# Patient Record
Sex: FEMALE | Race: WHITE | NOT HISPANIC OR LATINO | Employment: PART TIME | URBAN - METROPOLITAN AREA
[De-identification: names, ages, dates, MRNs, and addresses within clinical notes are randomized per-mention and may not be internally consistent; named-entity substitution may affect disease eponyms.]

---

## 2017-01-05 ENCOUNTER — OFFICE VISIT (OUTPATIENT)
Dept: INTERNAL MEDICINE | Facility: CLINIC | Age: 77
End: 2017-01-05

## 2017-01-05 VITALS — WEIGHT: 179 LBS | TEMPERATURE: 98.4 F | HEIGHT: 61 IN | BODY MASS INDEX: 33.79 KG/M2

## 2017-01-05 DIAGNOSIS — J06.9 ACUTE URI: Primary | ICD-10-CM

## 2017-01-05 LAB
EXPIRATION DATE: NORMAL
INTERNAL CONTROL: NORMAL
Lab: NORMAL
S PYO AG THROAT QL: NEGATIVE

## 2017-01-05 PROCEDURE — 87880 STREP A ASSAY W/OPTIC: CPT | Performed by: FAMILY MEDICINE

## 2017-01-05 PROCEDURE — 99213 OFFICE O/P EST LOW 20 MIN: CPT | Performed by: FAMILY MEDICINE

## 2017-01-05 RX ORDER — GUAIFENESIN AND CODEINE PHOSPHATE 100; 10 MG/5ML; MG/5ML
5 SOLUTION ORAL 3 TIMES DAILY PRN
Qty: 118 ML | Refills: 0 | Status: SHIPPED | OUTPATIENT
Start: 2017-01-05 | End: 2017-05-10

## 2017-01-05 RX ORDER — HYDROXYCHLOROQUINE SULFATE 200 MG/1
TABLET, FILM COATED ORAL
COMMUNITY
Start: 2016-12-13 | End: 2017-05-10

## 2017-01-05 NOTE — PROGRESS NOTES
"Subjective   Madelin Zuñiga is a 76 y.o. female.     History of Present Illness   Here today for possible strep. Last seen 12/7/16 for routine.    RESP- today pt reports she has been sick x3 days. 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 Is having head congestion with cough. Has not seen her phlegm.    The following portions of the patient's history were reviewed and updated as appropriate: current medications, past family history, past medical history, past social history, past surgical history and problem list.    Review of Systems   HENT: Positive for congestion, postnasal drip, rhinorrhea, sneezing and sore throat.    Respiratory: Positive for cough.    Cardiovascular: Negative for chest pain.   Gastrointestinal: Negative for abdominal distention and abdominal pain.   Skin: Negative for color change.   Neurological: Negative for tremors, speech difficulty and headaches.   Psychiatric/Behavioral: Negative for agitation and confusion.   All other systems reviewed and are negative.        Current Outpatient Prescriptions:   •  guaifenesin-codeine (GUAIFENESIN AC) 100-10 MG/5ML liquid, Take 5 mL by mouth 3 (Three) Times a Day As Needed for cough., Disp: 118 mL, Rfl: 0  •  hydroxychloroquine (PLAQUENIL) 200 MG tablet, , Disp: , Rfl:   •  lisinopril (PRINIVIL,ZESTRIL) 20 MG tablet, Take 1 tablet by mouth Daily., Disp: 90 tablet, Rfl: 1  •  metoprolol succinate XL (TOPROL-XL) 50 MG 24 hr tablet, TAKE 1 TABLET EVERY DAY, Disp: 90 tablet, Rfl: 1  •  ondansetron (ZOFRAN) 8 MG tablet, , Disp: , Rfl:     Objective     Visit Vitals   • Temp 98.4 °F (36.9 °C)   • Ht 61\" (154.9 cm)   • Wt 179 lb (81.2 kg)   • BMI 33.82 kg/m2       Physical Exam   Constitutional: She is oriented to person, place, and time. She appears well-developed and well-nourished.   HENT:   Right Ear: Tympanic membrane and ear canal normal.   Left Ear: Tympanic membrane and ear canal normal.   Mouth/Throat: Oropharynx is clear and " moist.   Eyes: Conjunctivae and EOM are normal. Pupils are equal, round, and reactive to light.   Neck: No thyromegaly present.   Cardiovascular: Normal rate and regular rhythm.    Pulmonary/Chest: Effort normal and breath sounds normal.   Neurological: She is alert and oriented to person, place, and time.   Skin: Skin is warm and dry.   Psychiatric: She has a normal mood and affect.   Vitals reviewed.      Assessment/Plan   Madelin was seen today for sore throat.    Diagnoses and all orders for this visit:    Acute URI  -     guaifenesin-codeine (GUAIFENESIN AC) 100-10 MG/5ML liquid; Take 5 mL by mouth 3 (Three) Times a Day As Needed for cough.      1. RESP- strep neg. Discussed that this is a viral URI. Will treat with codeine cough syrup. Pt has keflex at home that she can add if her phlegm turns discolored. To take 1 tsp bid x7 days if needed.  2. RECHECK- prn

## 2017-01-05 NOTE — MR AVS SNAPSHOT
Madelin Zuñiga   1/5/2017 10:15 AM   Office Visit    Dept Phone:  [phone]   Encounter #:  [id number]    Provider:  Porsche Martinez MD   Department:  [facility]                Your Full Care Plan              Today's Medication Changes          These changes are accurate as of: 1/5/17 11:04 AM.  If you have any questions, ask your nurse or doctor.               New Medication(s)Ordered:     guaifenesin-codeine 100-10 MG/5ML liquid   Commonly known as:  GUAIFENESIN AC   Take 5 mL by mouth 3 (Three) Times a Day As Needed for cough.   Started by:  Porsche Martinez MD            Where to Get Your Medications      You can get these medications from any pharmacy     Bring a paper prescription for each of these medications     guaifenesin-codeine 100-10 MG/5ML liquid                  Your Updated Medication List          This list is accurate as of: 1/5/17 11:04 AM.  Always use your most recent med list.                guaifenesin-codeine 100-10 MG/5ML liquid   Commonly known as:  GUAIFENESIN AC   Take 5 mL by mouth 3 (Three) Times a Day As Needed for cough.       hydroxychloroquine 200 MG tablet   Commonly known as:  PLAQUENIL       lisinopril 20 MG tablet   Commonly known as:  PRINIVIL,ZESTRIL   Take 1 tablet by mouth Daily.       metoprolol succinate XL 50 MG 24 hr tablet   Commonly known as:  TOPROL-XL   TAKE 1 TABLET EVERY DAY       ondansetron 8 MG tablet   Commonly known as:  ZOFRAN               You Were Diagnosed With        Codes Comments    Acute URI    -  Primary ICD-10-CM: J06.9  ICD-9-CM: 465.9       Instructions    1. RESP- strep neg. Discussed that this is a viral URI. Will treat with codeine cough syrup. Pt has keflex at home that she can add if her phlegm turns discolored. To take 1 tsp bid x7 days if needed.  2. RECHECK- prn     Patient Instructions History      Upcoming Appointments     Visit Type Date Time Department    OFFICE VISIT  "2017 10:15 AM [facility]    FOLLOW UP 2017  9:45 AM [facility]      MyChart Signup     [facility] [employer] allows you to send messages to your doctor, view your test results, renew your prescriptions, schedule appointments, and more. To sign up, go to [employer] and click on the Sign Up Now link in the New User? box. Enter your [employer] Activation Code exactly as it appears below along with the last four digits of your Social Security Number and your Date of Birth () to complete the sign-up process. If you do not sign up before the expiration date, you must request a new code.    [employer] Activation Code: [email or web address]  Expires: 2017 11:04 AM    If you have questions, you can email [email or web address] or call [phone] to talk to our [employer] staff. Remember, [employer] is NOT to be used for urgent needs. For medical emergencies, dial 911.               Other Info from Your Visit           Your Appointments     2017  9:45 AM EDT   Follow Up with Porsche Martinez MD   [facility] (--)    [facility] [address] [address] [phone]           Arrive 15 minutes prior to appointment.              Allergies     Sulfa Antibiotics        Reason for Visit     Sore Throat           Vital Signs     Temperature Height Weight Body Mass Index          98.4 °F (36.9 °C) 61\" (154.9 cm) 179 lb (81.2 kg) 33.82 kg/m2        Problems and Diagnoses Noted     Acute upper respiratory infection    -  Primary        "

## 2017-01-05 NOTE — PATIENT INSTRUCTIONS
1. RESP- strep neg. Discussed that this is a viral URI. Will treat with codeine cough syrup. Pt has keflex at home that she can add if her phlegm turns discolored. To take 1 tsp bid x7 days if needed.  2. RECHECK- prn

## 2017-01-06 ENCOUNTER — TELEPHONE (OUTPATIENT)
Dept: INTERNAL MEDICINE | Facility: CLINIC | Age: 77
End: 2017-01-06

## 2017-01-06 NOTE — TELEPHONE ENCOUNTER
----- Message from Ximena Benz sent at 1/6/2017  8:39 AM EST -----  STARTED KEFLEX,  IF SHE NEEDS TO DO ANYTHING ELSE, LET HER KNOW.

## 2017-02-08 RX ORDER — METOPROLOL SUCCINATE 50 MG/1
TABLET, EXTENDED RELEASE ORAL
Qty: 90 TABLET | Refills: 1 | Status: SHIPPED | OUTPATIENT
Start: 2017-02-08 | End: 2017-04-13 | Stop reason: SDUPTHER

## 2017-02-20 ENCOUNTER — TELEPHONE (OUTPATIENT)
Dept: INTERNAL MEDICINE | Facility: CLINIC | Age: 77
End: 2017-02-20

## 2017-02-20 NOTE — TELEPHONE ENCOUNTER
----- Message from Jignesh Delacruz sent at 2/17/2017  9:39 AM EST -----  Contact: [phone]  BLOOD PRESSURE HAS BEEN UP FOR ABOUT TWO WEEKS NOW AND CANT GET IT DOWN 186/8O... SOMETHING

## 2017-02-22 ENCOUNTER — TELEPHONE (OUTPATIENT)
Dept: INTERNAL MEDICINE | Facility: CLINIC | Age: 77
End: 2017-02-22

## 2017-02-22 NOTE — TELEPHONE ENCOUNTER
"----- Message from Porsche Martinez MD sent at 2/20/2017  5:30 PM EST -----  Contact: [phone]  Please tell her to take the metoprolol bid for the next 2wk. She can cut back down again when she is no longer sick or in pain or when her BP improves again.   ----- Message -----     From: Danelle Campbell MA     Sent: 2/20/2017   5:17 PM       To: Porsche Martinez MD    I spoke to pt this morning who says that her BP has gotten better over the weekend. She states that she has been sick for awhile with bronchitis and she assumes that is what's raising her BP. She also says that she \"smashed\" her toe with her cane. Pt is still taking both the lisinopril in the morning and metoprolol at night. She states that she was previously taking one or the other bid and wanted to know if she should go back to that. BP has now been around 160/74. Please advise and I will call pt in the morning. Thanks!     ----- Message -----     From: Jignesh Delacruz     Sent: 2/17/2017   9:39 AM       To: Danelle Campbell MA    BLOOD PRESSURE HAS BEEN UP FOR ABOUT TWO WEEKS NOW AND CANT GET IT DOWN 186/8O... SOMETHING       "

## 2017-02-22 NOTE — TELEPHONE ENCOUNTER
HEIKEM advising pt to take metoprolol bid for 2 weeks. I asked that pt call if she has any issues with high or either low BP until then.

## 2017-04-13 ENCOUNTER — TELEPHONE (OUTPATIENT)
Dept: INTERNAL MEDICINE | Facility: CLINIC | Age: 77
End: 2017-04-13

## 2017-04-13 RX ORDER — METOPROLOL SUCCINATE 50 MG/1
50 TABLET, EXTENDED RELEASE ORAL 2 TIMES DAILY
Qty: 20 TABLET | Refills: 0 | Status: SHIPPED | OUTPATIENT
Start: 2017-04-13 | End: 2017-04-23

## 2017-04-13 NOTE — TELEPHONE ENCOUNTER
----- Message from Zayda Murphy sent at 4/13/2017 10:07 AM EDT -----  Regarding: REFILL COMPLETELY OUT  HER REFILLS COME FROM HER MAIL ORDER. THEY HAVE SCREWED IT UP SO SHE WILL RECEIVE HER MEDS UNTIL 04/21. SHE TOOK HER LAST METOPROLOL THIS MORNING. CAN WE FILL ENOUGH METOPROLOL TO LAST HER UNTIL HER OTHER ARRIVES IN THE MAIL. [employer]. PATIENT [phone]. PLEASE CALL BEFORE NOON. SHE HAS TO GO TO WORK.

## 2017-04-13 NOTE — TELEPHONE ENCOUNTER
The patient states that she will be 8 days short as the mail in has not sent in her medications on time. Per pt she is still taking the toprol bid and this affected the refills. Pt informed that I will send in 10 days worth of the medication to due until her medication arrives. Rx faxed to [facility] [place].

## 2017-05-04 DIAGNOSIS — I10 ESSENTIAL HYPERTENSION: ICD-10-CM

## 2017-05-04 RX ORDER — LISINOPRIL 20 MG/1
TABLET ORAL
Qty: 90 TABLET | Refills: 1 | Status: SHIPPED | OUTPATIENT
Start: 2017-05-04 | End: 2017-10-02 | Stop reason: SDUPTHER

## 2017-05-10 ENCOUNTER — OFFICE VISIT (OUTPATIENT)
Dept: INTERNAL MEDICINE | Facility: CLINIC | Age: 77
End: 2017-05-10

## 2017-05-10 VITALS — BODY MASS INDEX: 34.74 KG/M2 | WEIGHT: 184 LBS | HEIGHT: 61 IN | TEMPERATURE: 97.4 F

## 2017-05-10 DIAGNOSIS — J30.9 ALLERGIC RHINITIS, UNSPECIFIED ALLERGIC RHINITIS TRIGGER, UNSPECIFIED RHINITIS SEASONALITY: Primary | ICD-10-CM

## 2017-05-10 DIAGNOSIS — I10 ESSENTIAL HYPERTENSION: ICD-10-CM

## 2017-05-10 DIAGNOSIS — M19.90 ARTHRITIS: ICD-10-CM

## 2017-05-10 PROCEDURE — 96372 THER/PROPH/DIAG INJ SC/IM: CPT | Performed by: FAMILY MEDICINE

## 2017-05-10 PROCEDURE — 99214 OFFICE O/P EST MOD 30 MIN: CPT | Performed by: FAMILY MEDICINE

## 2017-05-10 RX ORDER — TRIAMCINOLONE ACETONIDE 40 MG/ML
40 INJECTION, SUSPENSION INTRA-ARTICULAR; INTRAMUSCULAR ONCE
Status: COMPLETED | OUTPATIENT
Start: 2017-05-10 | End: 2017-05-10

## 2017-05-10 RX ADMIN — TRIAMCINOLONE ACETONIDE 40 MG: 40 INJECTION, SUSPENSION INTRA-ARTICULAR; INTRAMUSCULAR at 14:37

## 2017-05-23 ENCOUNTER — TELEPHONE (OUTPATIENT)
Dept: INTERNAL MEDICINE | Facility: CLINIC | Age: 77
End: 2017-05-23

## 2017-05-23 RX ORDER — METOPROLOL TARTRATE 50 MG/1
50 TABLET, FILM COATED ORAL 2 TIMES DAILY
Qty: 180 TABLET | Refills: 0 | Status: SHIPPED | OUTPATIENT
Start: 2017-05-23 | End: 2017-07-27 | Stop reason: SDUPTHER

## 2017-05-25 ENCOUNTER — TELEPHONE (OUTPATIENT)
Dept: INTERNAL MEDICINE | Facility: CLINIC | Age: 77
End: 2017-05-25

## 2017-06-08 ENCOUNTER — OFFICE VISIT (OUTPATIENT)
Dept: INTERNAL MEDICINE | Facility: CLINIC | Age: 77
End: 2017-06-08

## 2017-06-08 VITALS
HEIGHT: 61 IN | SYSTOLIC BLOOD PRESSURE: 132 MMHG | WEIGHT: 183 LBS | DIASTOLIC BLOOD PRESSURE: 72 MMHG | TEMPERATURE: 97.2 F | BODY MASS INDEX: 34.55 KG/M2

## 2017-06-08 DIAGNOSIS — I10 ESSENTIAL HYPERTENSION: Primary | ICD-10-CM

## 2017-06-08 DIAGNOSIS — M19.90 ARTHRITIS: ICD-10-CM

## 2017-06-08 PROCEDURE — 99214 OFFICE O/P EST MOD 30 MIN: CPT | Performed by: FAMILY MEDICINE

## 2017-06-08 NOTE — PROGRESS NOTES
"Subjective   Madelin Zuñiga is a 76 y.o. female.     History of Present Illness   Here for routine and recheck BP, allergies. Was last seen 5/10/17 as a work in for right ear pain. Was seen 1/5/17 for URI (neg strep test). Was seen 12/7/16 for routine. Labs done 5/10/16 demo normal CBC, CMP (Cr stable at 1.5), B12 and vit D (on supplement). Her lipid was up slightly with , . Had additional labs inpt. Gets her MCW in home thru [employer]. Recheck lab 11/7/16 demo improved Hg to 11.7 with normal B12 and iron.     1. CV- HTN and CRI. Currently on Lisinopril and metoprolol (no diuretic due to sulfa allergy). Has had to increase and decrease meds intermittently related to weight loss while sick. Was taken off metoprolol after her renal resection. However, she had elevated BP again while having pain and allergies and was restarted on the metoprolol 1mo ago. Today she reports she is still at bid on the metoprolol. Has had occasion 150 SBP at home, especially when in pain. DBP always normal.      2. - renal cell ca. Pt is post left renal resection 10/10/15 with clear margins. Post op she developed necrotizing fasciitis. Was inpt at  until 10/28/15. Had subsequent seroma and then was found to have a hernia as well. Had ventral hernia repair 9/6/16 and then developed a hematoma that got infected. Pt was then taken inpt again 9/29/16 thru 10/14/16 for left flank abscess with MRSA. She also had anemia during this. Hg was up from 6 yo 9 at discharge and recheck here was then 11.7 with normal iron and B12.     3. RHEUM- Pt sees Dr Obrien in rheumatology and Dr Fernandez in ortho. 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 No NSAID as she is allergic to ibu. Did well with stretches. Had orthovisc series 9/2015. Was off plaquenil at last visit 5/2017. Today pt reports she is having right inner thigh pain. Is intermittent and sharp/ knifelike. Improves after she \"walks it off\". Is especially bad at night. Has low back arthritis and " "was advised a shot here by Dr Fernandez. Is doing the routine stretches.    5. RESP- allergies. Has not been able to use antihistamines or singulair due to her Sjogrens. Has been using nasal steroid. Was having breakthrough PND and sneezing and was advised to increase the nasal steroid to bid. Then at her last visit she reported ER visit 1/2017 and was given abx. Was seen here with right ear pain (cerumen and sinus pressure). Was advised sweet oil and treated with kenalog. No irrigation as she got ill in past with this. Called reporting continued ear pain and was advised to see the ENT for possible ear evacuation. Today she reports that her ear finally improved gradually. Is still sneezing and blowing her nose but it is not bad. Has not tried the bee pollen yet.     The following portions of the patient's history were reviewed and updated as appropriate: current medications, past family history, past medical history, past social history, past surgical history and problem list.    Review of Systems   Cardiovascular: Negative for chest pain.   Gastrointestinal: Negative for abdominal distention and abdominal pain.   Skin: Negative for color change.   Neurological: Negative for tremors, speech difficulty and headaches.   Psychiatric/Behavioral: Negative for agitation and confusion.   All other systems reviewed and are negative.        Current Outpatient Prescriptions:   •  diclofenac (VOLTAREN) 1 % gel gel, Apply 4 g topically 4 (Four) Times a Day As Needed (pain or inflammation). To back and knee, Disp: 100 g, Rfl: 0  •  lisinopril (PRINIVIL,ZESTRIL) 20 MG tablet, TAKE 1 TABLET EVERY DAY, Disp: 90 tablet, Rfl: 1  •  metoprolol tartrate (LOPRESSOR) 50 MG tablet, Take 1 tablet by mouth 2 (Two) Times a Day for 90 days., Disp: 180 tablet, Rfl: 0    Objective     /72  Temp 97.2 °F (36.2 °C)  Ht 61\" (154.9 cm)  Wt 183 lb (83 kg)  BMI 34.58 kg/m2    Physical Exam   Constitutional: She is oriented to person, place, " and time. She appears well-developed and well-nourished.   HENT:   Right Ear: Tympanic membrane and ear canal normal.   Left Ear: Tympanic membrane and ear canal normal.   Mouth/Throat: Oropharynx is clear and moist.   Eyes: Conjunctivae and EOM are normal. Pupils are equal, round, and reactive to light.   Neck: No thyromegaly present.   Cardiovascular: Normal rate and regular rhythm.    Pulmonary/Chest: Effort normal and breath sounds normal.   Neurological: She is alert and oriented to person, place, and time.   Skin: Skin is warm and dry.   Psychiatric: She has a normal mood and affect.   Vitals reviewed.      Assessment/Plan   Madelin was seen today for follow-up.    Diagnoses and all orders for this visit:    Essential hypertension  -     CBC & Differential  -     Comprehensive Metabolic Panel  -     Lipid Panel  -     Vitamin B12  -     TSH    Arthritis  -     diclofenac (VOLTAREN) 1 % gel gel; Apply 4 g topically 4 (Four) Times a Day As Needed (pain or inflammation). To back and knee      1. CV- HTN- BP at goal. Discussed that the occasion high systolic number is reflecting pain. Will order labs to be done at outside provider due to insurance.  2. RESP- cerumen. Discussed that the wax is still present but is no longer sealed. To continue sweet oil daily, long term.   3. ORTHO- arthritis. Discussed that her thigh pain is likely referred from her back. She has f/u with ortho and rheum pending. She may need to try a DMARD. Advised voltaren gel for her back. Rx today  4. RECHECK- 6mo routine

## 2017-07-27 RX ORDER — METOPROLOL TARTRATE 50 MG/1
TABLET, FILM COATED ORAL
Qty: 180 TABLET | Refills: 0 | Status: SHIPPED | OUTPATIENT
Start: 2017-07-27 | End: 2017-09-27 | Stop reason: SDUPTHER

## 2017-09-07 ENCOUNTER — TELEPHONE (OUTPATIENT)
Dept: INTERNAL MEDICINE | Facility: CLINIC | Age: 77
End: 2017-09-07

## 2017-09-07 NOTE — TELEPHONE ENCOUNTER
LMOVM asking pt to call back so that I can triage this. I advised her that she might be able to stop by and leave a urine or if she needs to be worked in I can do that as well. I'll await for pt to call back to discuss.

## 2017-09-08 ENCOUNTER — TELEPHONE (OUTPATIENT)
Dept: INTERNAL MEDICINE | Facility: CLINIC | Age: 77
End: 2017-09-08

## 2017-09-08 ENCOUNTER — CLINICAL SUPPORT (OUTPATIENT)
Dept: INTERNAL MEDICINE | Facility: CLINIC | Age: 77
End: 2017-09-08

## 2017-09-08 DIAGNOSIS — N39.0 URINARY TRACT INFECTION WITHOUT HEMATURIA, SITE UNSPECIFIED: Primary | ICD-10-CM

## 2017-09-08 LAB
BILIRUB BLD-MCNC: NEGATIVE MG/DL
CLARITY, POC: CLEAR
COLOR UR: ABNORMAL
GLUCOSE UR STRIP-MCNC: NEGATIVE MG/DL
KETONES UR QL: NEGATIVE
LEUKOCYTE EST, POC: ABNORMAL
NITRITE UR-MCNC: NEGATIVE MG/ML
PH UR: 5 [PH] (ref 5–8)
PROT UR STRIP-MCNC: NEGATIVE MG/DL
RBC # UR STRIP: NEGATIVE /UL
SP GR UR: 1.03 (ref 1–1.03)
UROBILINOGEN UR QL: NORMAL

## 2017-09-08 PROCEDURE — 81003 URINALYSIS AUTO W/O SCOPE: CPT | Performed by: FAMILY MEDICINE

## 2017-09-08 RX ORDER — CIPROFLOXACIN 500 MG/1
500 TABLET, FILM COATED ORAL 2 TIMES DAILY
Qty: 14 TABLET | Refills: 0 | Status: SHIPPED | OUTPATIENT
Start: 2017-09-08 | End: 2017-09-15

## 2017-09-08 RX ORDER — CIPROFLOXACIN 500 MG/1
500 TABLET, FILM COATED ORAL 2 TIMES DAILY
COMMUNITY
End: 2017-09-08 | Stop reason: SDUPTHER

## 2017-09-08 NOTE — TELEPHONE ENCOUNTER
Patient is requesting a phone call about her urinalysis. (she would like more information about when you think the best time is to come)

## 2017-09-28 RX ORDER — METOPROLOL TARTRATE 50 MG/1
TABLET, FILM COATED ORAL
Qty: 180 TABLET | Refills: 0 | Status: SHIPPED | OUTPATIENT
Start: 2017-09-28 | End: 2017-12-08 | Stop reason: SDUPTHER

## 2017-10-02 DIAGNOSIS — I10 ESSENTIAL HYPERTENSION: ICD-10-CM

## 2017-10-02 RX ORDER — LISINOPRIL 20 MG/1
TABLET ORAL
Qty: 90 TABLET | Refills: 1 | Status: SHIPPED | OUTPATIENT
Start: 2017-10-02 | End: 2017-12-08 | Stop reason: SDUPTHER

## 2017-12-08 ENCOUNTER — OFFICE VISIT (OUTPATIENT)
Dept: INTERNAL MEDICINE | Facility: CLINIC | Age: 77
End: 2017-12-08

## 2017-12-08 VITALS
WEIGHT: 192 LBS | TEMPERATURE: 97.8 F | BODY MASS INDEX: 36.25 KG/M2 | DIASTOLIC BLOOD PRESSURE: 82 MMHG | SYSTOLIC BLOOD PRESSURE: 130 MMHG | HEIGHT: 61 IN

## 2017-12-08 DIAGNOSIS — J30.1 ACUTE NONSEASONAL ALLERGIC RHINITIS DUE TO POLLEN: ICD-10-CM

## 2017-12-08 DIAGNOSIS — M19.90 ARTHRITIS: ICD-10-CM

## 2017-12-08 DIAGNOSIS — I10 ESSENTIAL HYPERTENSION: Primary | ICD-10-CM

## 2017-12-08 DIAGNOSIS — D64.9 ANEMIA, UNSPECIFIED TYPE: ICD-10-CM

## 2017-12-08 DIAGNOSIS — N28.9 RENAL INSUFFICIENCY, MILD: ICD-10-CM

## 2017-12-08 PROCEDURE — 99214 OFFICE O/P EST MOD 30 MIN: CPT | Performed by: FAMILY MEDICINE

## 2017-12-08 RX ORDER — LISINOPRIL 20 MG/1
20 TABLET ORAL DAILY
Qty: 90 TABLET | Refills: 1 | Status: SHIPPED | OUTPATIENT
Start: 2017-12-08 | End: 2018-04-27 | Stop reason: DRUGHIGH

## 2017-12-08 RX ORDER — HYDROXYCHLOROQUINE SULFATE 200 MG/1
TABLET, FILM COATED ORAL
COMMUNITY
Start: 2017-11-16

## 2017-12-08 RX ORDER — METOPROLOL TARTRATE 50 MG/1
50 TABLET, FILM COATED ORAL 2 TIMES DAILY
Qty: 180 TABLET | Refills: 1 | Status: SHIPPED | OUTPATIENT
Start: 2017-12-08 | End: 2018-02-14 | Stop reason: SDUPTHER

## 2017-12-08 NOTE — PATIENT INSTRUCTIONS
1. CV- HTN, CRI- BP at goal. RF meds today. Discussed pt retiring and this is a good idea to reduce her stress and improve her health. Pt will stay very active.  2. ORTHO- arthritis- discussed that her +RF is not concerning as long as her sed rate does not go over the usual for her. Discussed that her current symptoms are still c/w Sjorgrens. Pt will f/u with Dr Cuba for additional discussion.  3. HEME- anemia- improved but borderline. Will check a B12 to ensure that she does not need to restart this for other issues.  4. RESP- allergies. Stable. Pt advised to use a Q tip to apply the nasal steroid when she needs to start it.  5. RECHECK- 6mo MCW

## 2017-12-08 NOTE — PROGRESS NOTES
Subjective   Madelin Zuñiga is a 77 y.o. female.     History of Present Illness   Here for 6mo routine. Last seen 6/8/17 for routine and recheck BP, allergies. Labs 6/8/17 demo normal CBC (Hg 11.5), CMP (except Cr 1.62, potassium 5.7), TSH, B12 and lipid with , tg 102, HDL 62, . Subsequent labs by specialist 11/21/17 demo Cr 1.62, potassium 5.6; +RF and elevated CRP and ESR.  Labs done 5/10/16 demo Cr stable at 1.5.     1. CV- HTN and CRI. Currently on Lisinopril and metoprolol (no diuretic due to sulfa allergy). Has had to increase and decrease meds intermittently related to weight loss while sick. Was taken off metoprolol after her renal resection. However, she had elevated BP again while having pain and allergies and was restarted on the metoprolol 5/2017 and was doing well at recheck 6/2017. Today she reports she is taking the lisinopril in am and the metoprolol bid.      2. - renal cell ca. Pt is post left renal resection 10/10/15 with clear margins. Post op she developed necrotizing fasciitis. Was inpt at  until 10/28/15. Had subsequent seroma and then was found to have a hernia as well. Had ventral hernia repair 9/6/16 and then developed a hematoma that got infected. Pt was then taken inpt again 9/29/16 thru 10/14/16 for left flank abscess with MRSA. She also had anemia during this time. Resolved with pt on iron and B12 since then. Today she reports she is off iron and B12 for many months now. Last Hg was stable at 11.5.  Today pt reports she will be getting a recheck MRI 1/2018.     3. RHEUM- Pt sees Dr Obrien in rheumatology and Dr Fernandez in ortho. 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 No NSAID as she is allergic to ibu. Did well with stretches. Had orthovisc series 9/2015. Was off plaquenil at  visit 5/2017. However, her lab 6/8/17 demo worsened Cr with elevated potassium and pt was referred  back to specialist. Lab 11/20/17 by Dr Hardi demo +RF with elevated CRP (1.5) and ESR (34). Today pt  "reports  She has been having very dry eyes and has been seeing the specialist due to corneal issues associated with the dry eyes. Was tried on cymbalta but did not tolerate it so was changed to plaquenil.      5. RESP- allergies. Has not been able to use antihistamines or singulair due to her Sjogrens. Has been using nasal steroid. Was having breakthrough PND and sneezing and was advised to increase the nasal steroid to bid. Then at her last visit she reported ER visit 1/2017 and was given abx. Was seen here with right ear pain (cerumen and sinus pressure). Was advised sweet oil and treated with kenalog. No irrigation as she got ill in past with this. Eventually improved. Today pt reports she is doing well currently. Usually has more issues in winter.     The following portions of the patient's history were reviewed and updated as appropriate: current medications, past family history, past medical history, past social history, past surgical history and problem list.    Review of Systems   Cardiovascular: Negative for chest pain.   Gastrointestinal: Negative for abdominal distention and abdominal pain.   Skin: Negative for color change.   Neurological: Negative for tremors, speech difficulty and headaches.   Psychiatric/Behavioral: Negative for agitation and confusion.   All other systems reviewed and are negative.        Current Outpatient Prescriptions:   •  diclofenac (VOLTAREN) 1 % gel gel, Apply 4 g topically 4 (Four) Times a Day As Needed (pain or inflammation). To back and knee, Disp: 100 g, Rfl: 0  •  hydroxychloroquine (PLAQUENIL) 200 MG tablet, , Disp: , Rfl:   •  lisinopril (PRINIVIL,ZESTRIL) 20 MG tablet, Take 1 tablet by mouth Daily., Disp: 90 tablet, Rfl: 1  •  metoprolol tartrate (LOPRESSOR) 50 MG tablet, Take 1 tablet by mouth 2 (Two) Times a Day., Disp: 180 tablet, Rfl: 1    Objective     /82  Temp 97.8 °F (36.6 °C)  Ht 154.9 cm (61\")  Wt 87.1 kg (192 lb)  BMI 36.28 kg/m2    Physical Exam "   Constitutional: She is oriented to person, place, and time. She appears well-developed and well-nourished.   HENT:   Right Ear: Tympanic membrane and ear canal normal.   Left Ear: Tympanic membrane and ear canal normal.   Mouth/Throat: Oropharynx is clear and moist.   Eyes: Conjunctivae and EOM are normal. Pupils are equal, round, and reactive to light.   Neck: No thyromegaly present.   Cardiovascular: Normal rate and regular rhythm.    Pulmonary/Chest: Effort normal and breath sounds normal.   Neurological: She is alert and oriented to person, place, and time.   Skin: Skin is warm and dry.   Psychiatric: She has a normal mood and affect.   Vitals reviewed.      Assessment/Plan   Madelin was seen today for follow-up.    Diagnoses and all orders for this visit:    Essential hypertension  -     metoprolol tartrate (LOPRESSOR) 50 MG tablet; Take 1 tablet by mouth 2 (Two) Times a Day.  -     lisinopril (PRINIVIL,ZESTRIL) 20 MG tablet; Take 1 tablet by mouth Daily.    Renal insufficiency, mild    Arthritis    Anemia, unspecified type  -     Vitamin B12    Acute nonseasonal allergic rhinitis due to pollen      1. CV- HTN, CRI- BP at goal. RF meds today. Discussed pt retiring and this is a good idea to reduce her stress and improve her health. Pt will stay very active.  2. ORTHO- arthritis- discussed that her +RF is not concerning as long as her sed rate does not go over the usual for her. Discussed that her current symptoms are still c/w Sjorgrens. Pt will f/u with Dr Cuba for additional discussion.  3. HEME- anemia- improved but borderline. Will check a B12 to ensure that she does not need to restart this for other issues.  4. RESP- allergies. Stable. Pt advised to use a Q tip to apply the nasal steroid when she needs to start it.  5. RECHECK- 6mo MCW

## 2017-12-28 ENCOUNTER — TELEPHONE (OUTPATIENT)
Dept: INTERNAL MEDICINE | Facility: CLINIC | Age: 77
End: 2017-12-28

## 2017-12-28 NOTE — TELEPHONE ENCOUNTER
Pt wants to no if you called her yesterday about her b12 shot? Pt said that she works 11:00 to 5:30

## 2017-12-28 NOTE — TELEPHONE ENCOUNTER
AKHIL advising pt that I did not contact her regarding B12. I did let her know that I saw a B12 result come through but I have not rec'd a message from Dr. Martinez advising me of a result. I will call pt as soon as I have any add'l information.

## 2018-02-14 DIAGNOSIS — I10 ESSENTIAL HYPERTENSION: ICD-10-CM

## 2018-02-15 RX ORDER — METOPROLOL TARTRATE 50 MG/1
TABLET, FILM COATED ORAL
Qty: 180 TABLET | Refills: 0 | Status: SHIPPED | OUTPATIENT
Start: 2018-02-15 | End: 2019-03-22 | Stop reason: SDUPTHER

## 2018-04-19 ENCOUNTER — TRANSITIONAL CARE MANAGEMENT TELEPHONE ENCOUNTER (OUTPATIENT)
Dept: INTERNAL MEDICINE | Facility: CLINIC | Age: 78
End: 2018-04-19

## 2018-04-19 ENCOUNTER — TELEPHONE (OUTPATIENT)
Dept: INTERNAL MEDICINE | Facility: CLINIC | Age: 78
End: 2018-04-19

## 2018-04-19 NOTE — TELEPHONE ENCOUNTER
Pt needs a hp fu for next Thursday or Friday. Pt said that she is having issues with her bp. Bp has been going up and down. Please call pt back with when she can get in.

## 2018-04-23 NOTE — OUTREACH NOTE
For VELMA call or interactive contact documentation, please refer to telephone message dated 4/19/18.

## 2018-04-27 ENCOUNTER — OFFICE VISIT (OUTPATIENT)
Dept: INTERNAL MEDICINE | Facility: CLINIC | Age: 78
End: 2018-04-27

## 2018-04-27 VITALS
HEIGHT: 61 IN | SYSTOLIC BLOOD PRESSURE: 118 MMHG | DIASTOLIC BLOOD PRESSURE: 70 MMHG | TEMPERATURE: 97.5 F | WEIGHT: 184.6 LBS | BODY MASS INDEX: 34.85 KG/M2

## 2018-04-27 DIAGNOSIS — I10 ESSENTIAL HYPERTENSION: Primary | ICD-10-CM

## 2018-04-27 PROCEDURE — 99495 TRANSJ CARE MGMT MOD F2F 14D: CPT | Performed by: FAMILY MEDICINE

## 2018-04-27 RX ORDER — LISINOPRIL 10 MG/1
10 TABLET ORAL DAILY
COMMUNITY
End: 2019-01-08 | Stop reason: DRUGHIGH

## 2018-04-27 RX ORDER — AMLODIPINE BESYLATE 5 MG/1
5 TABLET ORAL 2 TIMES DAILY
COMMUNITY
Start: 2018-04-17 | End: 2018-04-27

## 2018-04-27 NOTE — PATIENT INSTRUCTIONS
1. VELMA- post renal resection for renal ca. Wound healing well with no sign of infection or other complication.  2. HTN- discussed that the labile BP is situational and she has had the same in the past. She needs to wean off the norvasc by going to twice a day to once a day for a week and then stop it. Is to return to hr usual doses of metoprolol and lisinoril HCT starting today. Discussed that her goal SBP is around 130-140.   3. RECHECK- prn (keep Glo)

## 2018-04-27 NOTE — PROGRESS NOTES
Transitional Care Follow Up Visit  Subjective     Madelin Zuñiga is a 77 y.o. female who presents for a transitional care management visit.    Within 48 business hours after discharge our office contacted her via telephone to coordinate her care and needs.      I reviewed and discussed the details of that call along with the discharge summary, hospital problems, inpatient lab results, inpatient diagnostic studies, and consultation reports with Madelin.    No flowsheet data found.    History of Present Illness   Course During Hospital Stay:  04/12/2018 - 04/17/2018    Here for VELMA. Last seen 12/8/17 for 6mo routine. Last seen 6/8/17 for routine and recheck BP, allergies. Labs 6/8/17 demo normal CBC (Hg 11.5), CMP (except Cr 1.62, potassium 5.7), TSH, B12 and lipid with , tg 102, HDL 62, . Subsequent labs by specialist 11/21/17 demo Cr 1.62, potassium 5.6; +RF and elevated CRP and ESR.  Labs done 5/10/16 demo Cr stable at 1.5.     VELMA- Pt has h/o left renal ca, partial (1/3) resection 10/10/15. Had complications with post op necrotizing fasciitis, followed by seroma and hernia, followed by abscess with MRSA. Pt became anemic during the process but did well with iron and B12 supplements. Base back to baseline at her visit 12/8/17. Pt was inpt t [facility] 4/12/- 4/17/18. Pt has hx renal ca. Had new mass found on routine surveillance. Had left radical / total  nephrectomy with no complications. Today pt reports she got her pathology and has clear cell with one area around the renal vein that was not clear. Has 6wk recheck pending; may require radiation and then chemo. Had elevated BP and was given addition of norvasc and now that she is home she is going too low. Has tried cutting the lisinopril and metoprolol in half and taking the norvasc 1/2 bid. Her wound is healing well. Is only sore and only needing tylenol. Is urinating well.     1. CV- HTN and CRI. Currently on Lisinopril and metoprolol (no diuretic due to  sulfa allergy). Has had to increase and decrease meds intermittently related to weight loss while sick. Was taken off metoprolol after her renal resection. However, she had elevated BP again while having pain and allergies and was restarted on the metoprolol 5/2017. Had been stable on lisinopril in am and the metoprolol bid. Has had the addition of Norvasc related to recent renal issues.   2. RHEUM- Pt sees Dr Obrien in rheumatology and Dr Fernandez in ortho. 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 No NSAID as she is allergic to ibu. Did well with stretches. Had orthovisc series 9/2015. Was off plaquenil at  visit 5/2017. However, her lab 6/8/17 demo worsened Cr with elevated potassium and pt was referred  back to specialist. Lab 11/20/17 by Dr Cuba demcarmen +RF with elevated CRP (1.5) and ESR (34). Pt did not tolerate Cymbalta and was changed to plaquenil. Was still having dry eyes, seeing a corneal specialist.  3. RESP- allergies. Has not been able to use antihistamines or singulair due to her Sjogrens. Has been using nasal steroid. Was having breakthrough PND and sneezing and was advised to increase the nasal steroid to bid. Then at her last visit she reported ER visit 1/2017 and was given abx. Was seen here with right ear pain (cerumen and sinus pressure). Was advised sweet oil and treated with kenalog. No irrigation as she got ill in past with this. Eventually improved.     The following portions of the patient's history were reviewed and updated as appropriate: current medications, past family history, past medical history, past social history, past surgical history and problem list.    Review of Systems   Cardiovascular: Negative for chest pain.   Gastrointestinal: Negative for abdominal distention and abdominal pain.   Skin: Negative for color change.   Neurological: Negative for tremors, speech difficulty and headaches.   Psychiatric/Behavioral: Negative for agitation and confusion.   All other systems reviewed and  are negative.        Current Outpatient Prescriptions:   •  lisinopril (PRINIVIL,ZESTRIL) 10 MG tablet, Take 10 mg by mouth Daily., Disp: , Rfl:   •  diclofenac (VOLTAREN) 1 % gel gel, Apply 4 g topically 4 (Four) Times a Day As Needed (pain or inflammation). To back and knee, Disp: 100 g, Rfl: 0  •  hydroxychloroquine (PLAQUENIL) 200 MG tablet, , Disp: , Rfl:   •  metoprolol tartrate (LOPRESSOR) 50 MG tablet, TAKE 1 TABLET TWICE DAILY, Disp: 180 tablet, Rfl: 0    Objective   Physical Exam   Constitutional: She is oriented to person, place, and time. She appears well-developed and well-nourished.   HENT:   Right Ear: Tympanic membrane and ear canal normal.   Left Ear: Tympanic membrane and ear canal normal.   Mouth/Throat: Oropharynx is clear and moist.   Eyes: Conjunctivae and EOM are normal. Pupils are equal, round, and reactive to light.   Neck: No thyromegaly present.   Cardiovascular: Normal rate and regular rhythm.    Pulmonary/Chest: Effort normal and breath sounds normal.   Neurological: She is alert and oriented to person, place, and time.   Skin: Skin is warm and dry.   Psychiatric: She has a normal mood and affect.   Vitals reviewed.      Assessment/Plan   Madelin was seen today for follow-up.    Diagnoses and all orders for this visit:    Essential hypertension        1. VELMA- post renal resection for renal ca. Wound healing well with no sign of infection or other complication.  2. HTN- discussed that the labile BP is situational and she has had the same in the past. She needs to wean off the norvasc by going to twice a day to once a day for a week and then stop it. Is to return to hr usual doses of metoprolol and lisinoril HCT starting today. Discussed that her goal SBP is around 130-140.   3. RECHECK- prn (keep Glo)       Current outpatient and discharge medications have been reconciled for the patient.  Porsche Martinez MD

## 2018-04-30 ENCOUNTER — OFFICE VISIT (OUTPATIENT)
Dept: INTERNAL MEDICINE | Facility: CLINIC | Age: 78
End: 2018-04-30

## 2018-04-30 VITALS
WEIGHT: 184.6 LBS | SYSTOLIC BLOOD PRESSURE: 108 MMHG | BODY MASS INDEX: 34.85 KG/M2 | TEMPERATURE: 97.3 F | HEIGHT: 61 IN | DIASTOLIC BLOOD PRESSURE: 76 MMHG

## 2018-04-30 DIAGNOSIS — K11.20 PAROTIDITIS: ICD-10-CM

## 2018-04-30 DIAGNOSIS — Q87.19 SJOGREN-LARSSON SYNDROME: Primary | ICD-10-CM

## 2018-04-30 DIAGNOSIS — I10 ESSENTIAL HYPERTENSION: ICD-10-CM

## 2018-04-30 PROCEDURE — 96372 THER/PROPH/DIAG INJ SC/IM: CPT | Performed by: FAMILY MEDICINE

## 2018-04-30 PROCEDURE — 99214 OFFICE O/P EST MOD 30 MIN: CPT | Performed by: FAMILY MEDICINE

## 2018-04-30 RX ORDER — TRIAMCINOLONE ACETONIDE 40 MG/ML
40 INJECTION, SUSPENSION INTRA-ARTICULAR; INTRAMUSCULAR ONCE
Status: COMPLETED | OUTPATIENT
Start: 2018-04-30 | End: 2018-04-30

## 2018-04-30 RX ORDER — TRAMADOL HYDROCHLORIDE 50 MG/1
TABLET ORAL
Qty: 30 TABLET | Refills: 0 | Status: SHIPPED | OUTPATIENT
Start: 2018-04-30

## 2018-04-30 RX ADMIN — TRIAMCINOLONE ACETONIDE 40 MG: 40 INJECTION, SUSPENSION INTRA-ARTICULAR; INTRAMUSCULAR at 10:38

## 2018-05-21 ENCOUNTER — TELEPHONE (OUTPATIENT)
Dept: INTERNAL MEDICINE | Facility: CLINIC | Age: 78
End: 2018-05-21

## 2018-05-21 NOTE — TELEPHONE ENCOUNTER
Tell her that the best thing for this is lidocaine gel. It is OTC as aspircream or we can send in a Rx. Which does she prefer?

## 2018-05-21 NOTE — TELEPHONE ENCOUNTER
Pt seen in ER at [facility] on Saturday (Yana is getting records) for chest muscle pain. She states that they gave her muscle relaxers and oxycodone but she is still in a lot of pain. The only comfortable position that she can get in is lying on her back. She called ortho to schedule follow up appointment but they do not see these types of things. She says that she doesn't want to come in if there's not really anything we can offer her. She would just like some advise on what else she should do or if she does need to come in for a visit? Please advise.

## 2018-06-19 ENCOUNTER — OFFICE VISIT (OUTPATIENT)
Dept: INTERNAL MEDICINE | Facility: CLINIC | Age: 78
End: 2018-06-19

## 2018-06-19 VITALS
HEART RATE: 50 BPM | WEIGHT: 178 LBS | DIASTOLIC BLOOD PRESSURE: 60 MMHG | OXYGEN SATURATION: 98 % | HEIGHT: 61 IN | BODY MASS INDEX: 33.61 KG/M2 | SYSTOLIC BLOOD PRESSURE: 106 MMHG

## 2018-06-19 DIAGNOSIS — M19.90 ARTHRITIS: ICD-10-CM

## 2018-06-19 DIAGNOSIS — I10 ESSENTIAL HYPERTENSION: ICD-10-CM

## 2018-06-19 DIAGNOSIS — Q87.19 SJOGREN-LARSSON SYNDROME: ICD-10-CM

## 2018-06-19 DIAGNOSIS — Z00.00 ANNUAL PHYSICAL EXAM: Primary | ICD-10-CM

## 2018-06-19 DIAGNOSIS — Z78.0 POST-MENOPAUSE: ICD-10-CM

## 2018-06-19 PROCEDURE — G0439 PPPS, SUBSEQ VISIT: HCPCS | Performed by: FAMILY MEDICINE

## 2018-06-19 PROCEDURE — 96160 PT-FOCUSED HLTH RISK ASSMT: CPT | Performed by: FAMILY MEDICINE

## 2018-06-19 RX ORDER — AMLODIPINE BESYLATE 2.5 MG/1
TABLET ORAL
COMMUNITY
Start: 2018-06-14 | End: 2019-01-08

## 2018-06-19 NOTE — PATIENT INSTRUCTIONS
"1. MCW- will order bone density. Will skip mammo this year. Discussed deferring her colonoscopy.  Advised zostarix.   2. CV- HTN- discussed that she has complicated issues including some \"white coat syndrome\" as her BP is usually better here; also has had pain and recent steroid. To continue on the low dose norvasc and give it another week to see the max improvement it will provide. Will f/u with nephrology for this for now.  3. RHEUM- Sjorgrens- advised to take routine vitamin D at 1000 IU a day as there are studies that have shown that this can help improve the function of the immune system, including auto immune. Will check to see when her pneumovax was done. Pt to get zostarix at pharmacy.   4. ORTHO- arthritis with acute right clavicular strain. Discussed continuing the topical lidocaine. Discussed PT but pt is getting ready to start chemo and cannot pursue this at this time.  5. RECHECK- 6mo routine  "

## 2018-06-19 NOTE — PROGRESS NOTES
QUICK REFERENCE INFORMATION:  The ABCs of the Annual Wellness Visit    Subsequent Medicare Wellness Visit    HEALTH RISK ASSESSMENT    1940    Recent Hospitalizations:  Recently treated at the following:  Other: [facility].        Current Medical Providers:  Patient Care Team:  Porsche Martinez MD as PCP - General        Smoking Status:  History   Smoking Status   • Former Smoker   Smokeless Tobacco   • Not on file       Alcohol Consumption:  History   Alcohol use Not on file       Depression Screen:   PHQ-2/PHQ-9 Depression Screening 6/19/2018   Little interest or pleasure in doing things 0   Feeling down, depressed, or hopeless 0   Total Score 0       Health Habits and Functional and Cognitive Screening:  Functional & Cognitive Status 6/19/2018   Do you have difficulty preparing food and eating? No   Do you have difficulty bathing yourself, getting dressed or grooming yourself? No   Do you have difficulty using the toilet? No   Do you have difficulty moving around from place to place? Yes   Do you have trouble with steps or getting out of a bed or a chair? Yes   In the past year have you fallen or experienced a near fall? No   Current Diet Well Balanced Diet   Dental Exam Up to date   Eye Exam Up to date   Exercise (times per week) 3 times per week   Current Exercise Activities Include Yoga   Do you need help using the phone?  No   Are you deaf or do you have serious difficulty hearing?  No   Do you need help with transportation? No   Do you need help shopping? No   Do you need help preparing meals?  No   Do you need help with housework?  No   Do you need help with laundry? No   Do you need help taking your medications? No   Do you need help managing money? No   Do you ever drive or ride in a car without wearing a seat belt? No   Have you felt unusual stress, anger or loneliness in the last month? No   Who do you live with? Alone   If you need help, do you have trouble finding someone available to you? No    Have you been bothered in the last four weeks by sexual problems? No   Do you have difficulty concentrating, remembering or making decisions? No           Does the patient have evidence of cognitive impairment? No    Aspirin use counseling: Contraindicated from taking ASA      Recent Lab Results:  CMP:  Lab Results   Component Value Date    BUN 26 (H) 05/12/2015    CREATININE 1.4 (H) 05/12/2015     05/12/2015    K 4.8 05/12/2015    CO2 26 05/12/2015    CALCIUM 9.1 05/12/2015    ALBUMIN 4.1 10/20/2014    BILITOT 0.5 10/20/2014    ALKPHOS 75 10/20/2014    AST 20 10/20/2014    ALT 12 10/20/2014     Lipid Panel:  Lab Results   Component Value Date    TRIG 129 10/20/2014    HDL 60 10/20/2014     HbA1c:       Visual Acuity:  No exam data present    Age-appropriate Screening Schedule:  Refer to the list below for future screening recommendations based on patient's age, sex and/or medical conditions. Orders for these recommended tests are listed in the plan section. The patient has been provided with a written plan.    Health Maintenance   Topic Date Due   • TDAP/TD VACCINES (1 - Tdap) 08/21/1960   • ZOSTER VACCINE (1 of 2) 08/21/1991   • PNEUMOCOCCAL VACCINES (65+ LOW/MEDIUM RISK) (1 of 2 - PCV13) 08/21/2006   • COLONOSCOPY  11/07/2016   • MAMMOGRAM  05/17/2018   • INFLUENZA VACCINE  08/01/2018        Subjective   History of Present Illness    Madelin Zuñiga is a 77 y.o. female who presents for an Subsequent Wellness Visit.  Here for MCW. Last seen 4/30/18 for Sjorgrens flare. Was seen 4/27/18 for VELMA. Was seen 12/8/17 for 6mo routine. Labs 6/8/17 demo normal CBC (Hg 11.5), CMP (except Cr 1.62, potassium 5.7), TSH, B12 and lipid with , tg 102, HDL 62, . Subsequent labs by specialist 11/21/17 demo Cr 1.62, potassium 5.6; +RF and elevated CRP and ESR.  Labs done 5/10/16 demo Cr stable at 1.5.     1.-h/o left renal ca, partial (1/3) resection 10/10/15. Had post op necrotizing fasciitis, followed by seroma  and hernia, followed by abscess with MRSA. Pt became anemic during the process but did well with iron and B12 supplements. Base back to baseline at her visit 12/8/17. Pt then had new mass with surgery 4/2018 for left radical / total nephrectomy with no complications. Was advised she may require radiation followed by chemo.     2. RHEUM- sjorgrens. Pt sees Dr Obrien in rheumatology and Dr Frenandez in ortho. 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 No NSAID as she is allergic to ibu. Did well with stretches. Had Orthovisc series 9/2015. Was off plaquenil at  visit 5/2017. However, her lab 6/8/17 demo worsened Cr with elevated potassium and pt was referred  back to specialist. Lab 11/20/17 by Dr Rosa Elena linn +RF with elevated CRP (1.5) and ESR (34). Pt did not tolerate Cymbalta and was changed to plaquenil. Was still having dry eyes, seeing a corneal specialist. Pt was last seen here 4/30/18 due to partoditis, treated with kenalog and tramadol. Today she reports she responded well to the steroid. She then developed spontaneous drainage. She does do daily massage to keep her parotids draining. Pt pulled her right shoulder about 6wk ago. Went to the ER at [facility]; xray was neg. Is still sore. She was not able to tolerate the tramadol. Was advised lidocaine gel. States this is helping. Still has swelling.    3. CV- HTN and CRI. Currently on Lisinopril and metoprolol (no diuretic due to sulfa allergy). Has had to increase and decrease meds intermittently related to weight loss while sick. Was taken off metoprolol after her initial renal resection. However, she had elevated BP again while having pain and allergies and was restarted on the metoprolol 5/2017. She then had elevated BP during her last renal surgery and was given the addition or Norvasc inpt. When seen here her BP was starting to go low and she was given a Norvasc taper. Today she reports she was off the norvasc but then when she saw Dr Mabry (nephrology) 1wk ago she  was at 160 and was restarted on the norvasc but at a lower dose. Home checks are now 147-156 SBP, 68-71 DBP.    4. RESP- allergies. Has not been able to use antihistamines or singulair due to her Sjogrens. Has been using nasal steroid.    5. MCW- Last done:  SCREENING:   Bone Density: due   Colonoscopy: 2013- Dr Stroud   Mammogram: today pt reports she got it done in 2017 around October. On record she had 5/17/16   Pap: NA. No DUB:   Glaucoma: Dr Solis with frequent exams due to Sjrogrens   Low dose CT chest (smokers): remote smoker, quit over 40yr ago. Is getting scans due to possible mets from her kidney  IMMUNIZATIONS:   Pneumovax: x2 in past   Prevnar 13: 12/2015 at [facility]   Zostavax: yes.   Flu: yearly at [facility]   TDaP: 2013  Hep B series: NA  LIVING WILL: yes  SPECIALTY: Rheumatology- Dr Cuba   Ortho- Dr Fernandez   Urology Onc- Dr Peralta at    Nepology- Nephrology Asso   Optho- Dr Solis    The following portions of the patient's history were reviewed and updated as appropriate: allergies, past family history, past medical history, past social history, past surgical history and problem list.    Outpatient Medications Prior to Visit   Medication Sig Dispense Refill   • hydroxychloroquine (PLAQUENIL) 200 MG tablet      • lisinopril (PRINIVIL,ZESTRIL) 10 MG tablet Take 10 mg by mouth Daily.     • metoprolol tartrate (LOPRESSOR) 50 MG tablet TAKE 1 TABLET TWICE DAILY 180 tablet 0   • diclofenac (VOLTAREN) 1 % gel gel Apply 4 g topically 4 (Four) Times a Day As Needed (pain or inflammation). To back and knee 100 g 0   • traMADol (ULTRAM) 50 MG tablet Take 1-2 tab po q6hr prn pain 30 tablet 0     No facility-administered medications prior to visit.        Patient Active Problem List   Diagnosis   • Acid reflux   • Anemia   • Arthritis   • Diverticulitis   • Essential hypertension   • IBS (irritable bowel syndrome)   • Insomnia   • Renal cancer   • Renal insufficiency, mild   • Seroma, postoperative   •  "Sjogren-Philip syndrome   • Allergic rhinitis       Advance Care Planning:  has an advance directive - a copy has been provided and is in file    Identification of Risk Factors:  Risk factors include: low bp.    Review of Systems   Cardiovascular: Negative for chest pain.   Gastrointestinal: Negative for abdominal distention and abdominal pain.   Skin: Negative for color change.   Neurological: Negative for tremors, speech difficulty and headaches.   Psychiatric/Behavioral: Negative for agitation and confusion.   All other systems reviewed and are negative.      Compared to one year ago, the patient feels her physical health is worse.  Compared to one year ago, the patient feels her mental health is the same.    Objective     Physical Exam   Constitutional: She is oriented to person, place, and time. She appears well-developed and well-nourished.   HENT:   Right Ear: Tympanic membrane and ear canal normal.   Left Ear: Tympanic membrane and ear canal normal.   Mouth/Throat: Oropharynx is clear and moist.   Eyes: Conjunctivae and EOM are normal. Pupils are equal, round, and reactive to light.   Neck: No thyromegaly present.   Cardiovascular: Normal rate and regular rhythm.    Pulmonary/Chest: Effort normal and breath sounds normal.   Musculoskeletal:   Fulness right clavicle at sternum. Full ROM with twinge of pain with full elevation.   Neurological: She is alert and oriented to person, place, and time.   Skin: Skin is warm and dry.   Psychiatric: She has a normal mood and affect.   Vitals reviewed.      Vitals:    06/19/18 1011   BP: 106/60   Pulse: 50   SpO2: 98%   Weight: 80.7 kg (178 lb)   Height: 154.9 cm (60.98\")       Patient's Body mass index is 33.65 kg/m². BMI is above normal parameters. Recommendations include: no follow-up required.      Assessment/Plan   Patient Self-Management and Personalized Health Advice  The patient has been provided with information about: fall prevention and preventive services " "including:   · Advance directive.    Visit Diagnoses:  No diagnosis found.    No orders of the defined types were placed in this encounter.      Outpatient Encounter Prescriptions as of 6/19/2018   Medication Sig Dispense Refill   • Acetaminophen (TYLENOL) 325 MG capsule Take  by mouth.     • amLODIPine (NORVASC) 2.5 MG tablet      • hydroxychloroquine (PLAQUENIL) 200 MG tablet      • lisinopril (PRINIVIL,ZESTRIL) 10 MG tablet Take 10 mg by mouth Daily.     • metoprolol tartrate (LOPRESSOR) 50 MG tablet TAKE 1 TABLET TWICE DAILY 180 tablet 0   • diclofenac (VOLTAREN) 1 % gel gel Apply 4 g topically 4 (Four) Times a Day As Needed (pain or inflammation). To back and knee 100 g 0   • traMADol (ULTRAM) 50 MG tablet Take 1-2 tab po q6hr prn pain 30 tablet 0     No facility-administered encounter medications on file as of 6/19/2018.        Reviewed use of high risk medication in the elderly: not applicable  Reviewed for potential of harmful drug interactions in the elderly: not applicable    Follow Up:  No Follow-up on file.     An After Visit Summary and PPPS with all of these plans were given to the patient.    1. MCW- will order bone density. Will skip mammo this year. Discussed deferring her colonoscopy. Advised zostarix.   2. CV- HTN- discussed that she has complicated issues including some \"white coat syndrome\" as her BP is usually better here; also has had pain and recent steroid. To continue on the low dose norvasc and give it another week to see the max improvement it will provide. Will f/u with nephrology for this for now.  3. RHEUM- Sjorgrens- advised to take routine vitamin D at 1000 IU a day as there are studies that have shown that this can help improve the function of the immune system, including auto immune. Will check to see when her pneumovax was done. Pt to get zostarix at pharmacy.   4. ORTHO- arthritis with acute right clavicular strain. Discussed continuing the topical lidocaine. Discussed PT but pt " is getting ready to start chemo and cannot pursue this at this time.  5. RECHECK- 6mo routine

## 2018-09-21 ENCOUNTER — TELEPHONE (OUTPATIENT)
Dept: INTERNAL MEDICINE | Facility: CLINIC | Age: 78
End: 2018-09-21

## 2018-09-21 NOTE — TELEPHONE ENCOUNTER
PATIENT WAS IN [facility] FOR DEHYDRATION. SHE DOESN'T WANT TO COME IN BUT I THOUGHT YOU COULD ADVISE HER. CALL HER [phone] DIAD. DRY KIDNEY

## 2019-01-08 ENCOUNTER — OFFICE VISIT (OUTPATIENT)
Dept: INTERNAL MEDICINE | Facility: CLINIC | Age: 79
End: 2019-01-08

## 2019-01-08 VITALS
DIASTOLIC BLOOD PRESSURE: 88 MMHG | BODY MASS INDEX: 31.72 KG/M2 | HEIGHT: 61 IN | TEMPERATURE: 97.4 F | WEIGHT: 168 LBS | SYSTOLIC BLOOD PRESSURE: 138 MMHG

## 2019-01-08 DIAGNOSIS — I10 ESSENTIAL HYPERTENSION: Primary | ICD-10-CM

## 2019-01-08 DIAGNOSIS — J30.1 NON-SEASONAL ALLERGIC RHINITIS DUE TO POLLEN: ICD-10-CM

## 2019-01-08 PROCEDURE — 99213 OFFICE O/P EST LOW 20 MIN: CPT | Performed by: FAMILY MEDICINE

## 2019-01-08 RX ORDER — LISINOPRIL 5 MG/1
TABLET ORAL
COMMUNITY
Start: 2018-11-02 | End: 2019-01-14 | Stop reason: DRUGHIGH

## 2019-01-08 RX ORDER — CABOZANTINIB 40 MG/1
TABLET ORAL
COMMUNITY
Start: 2018-12-27

## 2019-01-08 NOTE — PATIENT INSTRUCTIONS
1. CV- HTN- BP elevations related to chemo. Discussed that her BP is ok here today. She is to come by several mornings a week for the next 1-2wk for BP check as this is a regular cuff instead of electronic. If she is not at goal (150's) she can increase the lisinopril to 20mg but is not to do that now as her home checks may be over estimating her BP.   2. RESP- allergies- stable. Having cerumen on right. Advised to try warmed olive oil.   3. RECHECK- May

## 2019-01-08 NOTE — PROGRESS NOTES
Subjective   Madelin Zuñiga is a 78 y.o. female.     History of Present Illness   Here for 6mo routine. 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 Was seen 4/30/18 for Sjorgrens flare. Was seen 4/27/18 for VELMA. Labs 6/8/17 demo normal CBC (Hg 11.5), CMP (except Cr 1.62, potassium 5.7), TSH, B12 and lipid with , tg 102, HDL 62, . Subsequent labs by specialist 11/21/17 demo Cr 1.62, potassium 5.6; +RF and elevated CRP and ESR.  Labs done 5/10/16 demo Cr stable at 1.5.     1.- left renal ca, h/o partial (1/3) resection 10/10/15. Had post op necrotizing fasciitis, followed by seroma and hernia, followed by abscess with MRSA. Pt became anemic during the process but did well with iron and B12 supplements. Back to baseline at her visit 12/8/17. Pt then had new mass with surgery 4/2018 for left radical / total nephrectomy with no complications. Was then treated with radiation followed by chemo. Recent records reviewed with note from 10/1/18 noting that chemo (optivo) was stopped due to ocular Sjogren/ arthralgia flare. Had significant shrinkage of nodules while on chemo but return once chemo stopped. Last chest CT 12/3/18 showing progressive lung nodules/ metastasis; CT abd neg. Labs reviewed. Today pt reports she has been on cabozantinib for 2wk now and is tolerating it well. It can raise her BP Her next CT is due in Feb. Has lost 10 lb to 168 lb and still feels well. Is on iron and B12 and feels better.     2. RHEUM- sjorgrens. Pt sees Dr Obrien in rheumatology and Dr Fernandez in ortho. 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 No NSAID as she is allergic to ibu. Did well with stretches. Had Orthovisc series 9/2015. Was off plaquenil at  visit 5/2017. However, her lab 6/8/17 demo worsened Cr with elevated potassium and pt was referred  back to specialist. Lab 11/20/17 by Dr Cuba demo +RF with elevated CRP (1.5) and ESR (34). Pt did not tolerate Cymbalta and was changed to plaquenil. Was still having dry eyes, seeing a corneal  specialist. Pt was last seen here 4/30/18 due to partodititis, treated with kenalog and tramadol and did well. Pulled her right shoulder 5/2018, treated with lidocaine gel. Today she reports the arthralgias and ocular flare she had while on chemo improved when the chemo was stopped. Is currently on plaquenil. Is walking with a cane with no pain other than her knee. Just got steroid shots in both knees and this helped.      3. CV- HTN and CRI. Currently on Lisinopril and metoprolol (no diuretic due to sulfa allergy). Has had to increase and decrease meds intermittently related to weight loss while sick. Was taken off metoprolol after her initial renal resection. However, she had elevated BP again while having pain and allergies and was restarted on the metoprolol 5/2017. She then had elevated BP during her last renal surgery and was given the addition or Norvasc inpt. When seen here her BP was starting to go low and she was given a Norvasc taper but then saw nephrology (Dr Mabry) and BP was up again; was continued on norvasc 5. Today she reports she is current taking metoprolol 25 bid and lisinopril 10 qd. She had /100 when first started the new chemo. Had change in meds and has improved to 160's/80's. Is highest in am. Sees nephrology in 1mo. Was advised goal BP of 150/70 by nephrology. Had been advised she may need to go to 20mg on lisinopril.      4. RESP- allergies. Has not been able to use antihistamines or singulair due to her Sjogrens. Has been using nasal steroid. Today she reports she has had ear clogging for 2wk. Has been using debrox on right.     The following portions of the patient's history were reviewed and updated as appropriate: current medications, past family history, past medical history, past social history, past surgical history and problem list.    Review of Systems   Cardiovascular: Negative for chest pain.   Gastrointestinal: Negative for abdominal distention and abdominal pain.  "  Skin: Negative for color change.   Neurological: Negative for tremors, speech difficulty and headaches.   Psychiatric/Behavioral: Negative for agitation and confusion.   All other systems reviewed and are negative.        Current Outpatient Medications:   •  Acetaminophen (TYLENOL) 325 MG capsule, Take  by mouth., Disp: , Rfl:   •  CABOMETYX 40 MG tablet, , Disp: , Rfl:   •  diclofenac (VOLTAREN) 1 % gel gel, Apply 4 g topically 4 (Four) Times a Day As Needed (pain or inflammation). To back and knee, Disp: 100 g, Rfl: 0  •  hydroxychloroquine (PLAQUENIL) 200 MG tablet, , Disp: , Rfl:   •  lisinopril (PRINIVIL,ZESTRIL) 5 MG tablet, , Disp: , Rfl:   •  metoprolol tartrate (LOPRESSOR) 50 MG tablet, TAKE 1 TABLET TWICE DAILY, Disp: 180 tablet, Rfl: 0  •  traMADol (ULTRAM) 50 MG tablet, Take 1-2 tab po q6hr prn pain, Disp: 30 tablet, Rfl: 0    Objective     /88   Temp 97.4 °F (36.3 °C)   Ht 154.9 cm (61\")   Wt 76.2 kg (168 lb)   BMI 31.74 kg/m²     Physical Exam   Constitutional: She is oriented to person, place, and time. She appears well-developed and well-nourished.   HENT:   Right Ear: Tympanic membrane and ear canal normal.   Left Ear: Tympanic membrane and ear canal normal.   Mouth/Throat: Oropharynx is clear and moist.   Eyes: Conjunctivae and EOM are normal. Pupils are equal, round, and reactive to light.   Neck: No thyromegaly present.   Cardiovascular: Normal rate and regular rhythm.   Pulmonary/Chest: Effort normal and breath sounds normal.   Neurological: She is alert and oriented to person, place, and time.   Skin: Skin is warm and dry.   Psychiatric: She has a normal mood and affect.   Vitals reviewed.      Assessment/Plan   Madelin was seen today for follow-up.    Diagnoses and all orders for this visit:    Essential hypertension    Non-seasonal allergic rhinitis due to pollen        1. CV- HTN- BP elevations related to chemo. Discussed that her BP is ok here today. She is to come by several " mornings a week for the next 1-2wk for BP check as this is a regular cuff instead of electronic. If she is not at goal (150's) she can increase the lisinopril to 20mg but is not to do that now as her home checks may be over estimating her BP.   2. RESP- allergies- stable. Having cerumen on right. Advised to try warmed olive oil.   3. RECHECK- May

## 2019-01-10 ENCOUNTER — TELEPHONE (OUTPATIENT)
Dept: INTERNAL MEDICINE | Facility: CLINIC | Age: 79
End: 2019-01-10

## 2019-01-11 ENCOUNTER — TELEPHONE (OUTPATIENT)
Dept: INTERNAL MEDICINE | Facility: CLINIC | Age: 79
End: 2019-01-11

## 2019-01-11 NOTE — TELEPHONE ENCOUNTER
Please tell her to double the lisinopril from 10 to 20mg and this should do it. She is still welcome to get BP checks if she wants but she will also be following up with nephrology for this.

## 2019-01-11 NOTE — TELEPHONE ENCOUNTER
Pt BP on 1/10/19 was 162/86, today it was 154/90 here at our office and 154/80 at her oncology visit today. Pt wanted to know if you could make a decision regarding her BP medication and dosing just based on those numbers so she wouldn't have to come back in for BP checks next week. Please advise.     Also, pt states that they did not increase her chemo meds.

## 2019-01-12 NOTE — TELEPHONE ENCOUNTER
Pt informed. She states that she still has lisinopril 20mg from a previous prescription and then nephrology would take over rx. Pt advised to call if she needs anything from us.

## 2019-01-14 ENCOUNTER — TELEPHONE (OUTPATIENT)
Dept: INTERNAL MEDICINE | Facility: CLINIC | Age: 79
End: 2019-01-14

## 2019-01-14 RX ORDER — LISINOPRIL 10 MG/1
10 TABLET ORAL DAILY
Qty: 30 TABLET | Refills: 0 | Status: SHIPPED | OUTPATIENT
Start: 2019-01-14 | End: 2019-03-21

## 2019-01-14 NOTE — TELEPHONE ENCOUNTER
Pt called stating that the nephrologist is going to have her take 10 mg in the morning and 20 mg at night. Pt needs refills of 10 mg as she cannot get in touch with nephrologists office. Will send in 10 mg now. Pt aware.

## 2019-02-04 ENCOUNTER — TELEPHONE (OUTPATIENT)
Dept: INTERNAL MEDICINE | Facility: CLINIC | Age: 79
End: 2019-02-04

## 2019-02-04 RX ORDER — LEVOFLOXACIN 750 MG/1
TABLET ORAL
Qty: 5 TABLET | Refills: 0 | Status: SHIPPED | OUTPATIENT
Start: 2019-02-04 | End: 2019-03-21

## 2019-02-04 NOTE — TELEPHONE ENCOUNTER
Pt states that she will skip the medication and see if she can just take the abx and get well from that. Pt advised if she is not feeling better or is getting worse to let me know and I can work her in to the schedule.

## 2019-02-04 NOTE — TELEPHONE ENCOUNTER
She can do a Leena Pot. No other meds due to her Sjorgrens and kidney cancer (chemo) except that she can have tessalon pearles. Has she taken these before. If she is ok with this, we can send in an Rx.

## 2019-02-04 NOTE — TELEPHONE ENCOUNTER
Pt advised of medication at pharmacy to . She says that she is having awful headaches/sinus pressure and has been taking tylenol. She's asking if there's anything else she could take safely? Please advise.

## 2019-02-04 NOTE — TELEPHONE ENCOUNTER
Please tell her I sent in levaquin. This is not related to any abx she is allergic to and is very powerful but only taken for 5 days which is the safest option given her hx.

## 2019-02-06 ENCOUNTER — TELEPHONE (OUTPATIENT)
Dept: INTERNAL MEDICINE | Facility: CLINIC | Age: 79
End: 2019-02-06

## 2019-03-14 ENCOUNTER — TRANSITIONAL CARE MANAGEMENT TELEPHONE ENCOUNTER (OUTPATIENT)
Dept: INTERNAL MEDICINE | Facility: CLINIC | Age: 79
End: 2019-03-14

## 2019-03-15 NOTE — OUTREACH NOTE
VELMA call completed.  Please refer to TCM call flowsheet for call documentation.    The patient states she is feeling better. She states she continues with BLE edema however sx's continue to improve daily. Pt is monitoring daily weights. Pt states she is breathing easier, no cough or wheezing. She is eating a renal diet. Pt states urinating well. She is ambulating with a walker. Pt states she is concerned about finding a ride to PCP appt 3/21/19 however states if BLE edema contiues to improve she feels she will be able to drive safely. Encouraged her to contact Patient Care Coordinators if transportation assistance is needed and coordinators will reach out to  for assistance. Pt denies any questions or needs at time of call. PCP appt confirmed.

## 2019-03-21 ENCOUNTER — OFFICE VISIT (OUTPATIENT)
Dept: INTERNAL MEDICINE | Facility: CLINIC | Age: 79
End: 2019-03-21

## 2019-03-21 VITALS
DIASTOLIC BLOOD PRESSURE: 68 MMHG | SYSTOLIC BLOOD PRESSURE: 150 MMHG | WEIGHT: 169 LBS | TEMPERATURE: 97.1 F | HEIGHT: 61 IN | BODY MASS INDEX: 31.91 KG/M2

## 2019-03-21 DIAGNOSIS — I10 ESSENTIAL HYPERTENSION: ICD-10-CM

## 2019-03-21 DIAGNOSIS — N28.9 RENAL INSUFFICIENCY, MILD: ICD-10-CM

## 2019-03-21 DIAGNOSIS — J18.9 COMMUNITY ACQUIRED PNEUMONIA, UNSPECIFIED LATERALITY: ICD-10-CM

## 2019-03-21 DIAGNOSIS — R60.9 PERIPHERAL EDEMA: Primary | ICD-10-CM

## 2019-03-21 PROCEDURE — 99495 TRANSJ CARE MGMT MOD F2F 14D: CPT | Performed by: FAMILY MEDICINE

## 2019-03-21 NOTE — PATIENT INSTRUCTIONS
1. CV/ RENAL- discussed that if nephrology calls her before the weekend, to follow their instructions. If they do not get a hold of her in time, to start to cut down on the isosorbide. She is currently taking 3 tabs twice a day; is to cut down to 3 in am and 2 in pm. No change in metoprolol or nifedipine. Discussed that these all can help with BP, fluid overload and afib. Pt will then keep her 1wk recheck with nephrology. Advised to wrap her legs to address the swelling that is limited to just her lower legs.   2. RESP- pneumonia clinically improved.  3. RECHECK- keep appointment in May

## 2019-03-21 NOTE — PROGRESS NOTES
Transitional Care Follow Up Visit  Subjective     Madelin Zuñiga is a 78 y.o. female who presents for a transitional care management visit.    Within 48 business hours after discharge our office contacted her via telephone to coordinate her care and needs.      I reviewed and discussed the details of that call along with the discharge summary, hospital problems, inpatient lab results, inpatient diagnostic studies, and consultation reports with Madelin.    Date of TCM Phone Call 3/15/2019   Hospital [facility]   Date of Admission 2/27/2019   Date of Discharge 3/12/2019   Discharge Disposition Home or Self Care       History of Present Illness   Course During Hospital Stay: 02/27/2019 - 03/12/2019  Here for VELMA. Last seen 1/8/19 for 6mo routine. 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 Was seen 4/30/18 for Sjorgrens flare. Was seen 4/27/18 for VELMA. Labs 6/8/17 demo normal CBC (Hg 11.5), CMP (except Cr 1.62, potassium 5.7), TSH, B12 and lipid with , tg 102, HDL 62, . Subsequent labs by specialist 11/21/17 demo Cr 1.62, potassium 5.6; +RF and elevated CRP and ESR.  Labs done 5/10/16 demo Cr stable at 1.5.    VELMA/- left renal ca, h/o partial (1/3) resection 10/10/15. Had post op necrotizing fasciitis, followed by seroma and hernia, followed by abscess with MRSA. Pt became anemic during the process but did well with iron and B12 supplements. Back to baseline at her visit 12/8/17. Pt then had new mass with surgery 4/2018 for left radical / total nephrectomy with no complications. Was then treated with radiation followed by chemo. Recent records reviewed with note from 10/1/18 noting that chemo (optivo) was stopped due to ocular Sjogren/ arthralgia flare. Had significant shrinkage of nodules while on chemo but return once chemo stopped. Last chest CT 12/3/18 showing progressive lung nodules/ metastasis; CT abd neg. Pt changed to cabozantinib  and was tolerating it well other than elevated BP. Had lost 10 lb to 168  lb but was taking iron and B12 and felt well.  Also got pneumonia, was treated with levaquin inpt. No abx at discharge.     Pt hospitalized at [facility] 2/27/19 - 3/12/19 for acute on chronic renal failure, acute respiratory failure with suspected pulmonary edema and accelerated HTN. Pt was admitted due to dyspnea with N/V. Had been given bumex by GI but had not started it yet. Echo and resp w/u neg. Pt treated with IV albumin and diuresed adequately. Was followed by nephrology inpt. Was deconditioned but insurance denied rehab. Today pt reports she had brief afib but this self corrected with fluid correction (30 lb off in 2 days) with no additional w/u indicated. Her BP meds were changed. Reports that the fluid overload was from kidney failure related to the chemo. Out patient she was given bumex to take until she was back to her goal weight and is there now. BP meds are now isosorbid, metoprolol and nifedipine. BP has gradually gotten better and now has been too low at home. Is higher today as this is her first time getting out. Is waiting for call back from nephrology to adjust her BP meds.     2. CV- HTN and CRI. Currently on Lisinopril and metoprolol (no diuretic due to sulfa allergy). Has had to increase and decrease meds intermittently related to weight loss while sick. Was taken off metoprolol after her initial renal resection. However, she had elevated BP again while having pain and allergies and was restarted on the metoprolol 5/2017. She then had elevated BP during her last renal surgery and was given the addition or Norvasc inpt. When seen here her BP was starting to go low and she was given a Norvasc taper but then saw nephrology (Dr Mabry) and BP was up again; was continued on norvasc 5. Then at her last visit she had been changed to metoprolol 25 bid and lisinopril 10 qd by nephrology. Was having elevated BP related to chemo. Had been advised by nephrology that her goal BP was 150/70. Continued to run  160’s and was increased on Lisinopril to 20mg after discussion here.   3.RHEUM- sjorgrens. Pt sees Dr Obrien in rheumatology and Dr Fernandez in ortho. 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 No NSAID as she is allergic to ibu. Did well with stretches. Had Orthovisc series 9/2015. Was off plaquenil at  visit 5/2017. However, her lab 6/8/17 demo worsened Cr with elevated potassium and pt was referred  back to specialist. Lab 11/20/17 by Dr Rosa Elena linn +RF with elevated CRP (1.5) and ESR (34). Pt did not tolerate Cymbalta and was changed to plaquenil. Was still having dry eyes, seeing a corneal specialist. Pt was seen here 4/30/18 due to partodititis, treated with kenalog and tramadol and did well. Pulled her right shoulder 5/2018, treated with lidocaine gel. At her visit 1/8/19 she reported that the arthralgias and ocular flare she had while on chemo improved when the chemo was changed. Still on plaquenil.     4. RESP- allergies. Has not been able to use antihistamines or singulair due to her Sjogrens. Has been using nasal steroid.      The following portions of the patient's history were reviewed and updated as appropriate: past family history, past medical history, past social history, past surgical history and problem list.    Review of Systems   Cardiovascular: Negative for chest pain.   Gastrointestinal: Negative for abdominal distention and abdominal pain.   Skin: Negative for color change.   Neurological: Negative for tremors, speech difficulty and headaches.   Psychiatric/Behavioral: Negative for agitation and confusion.   All other systems reviewed and are negative.        Current Outpatient Medications:   •  Acetaminophen (TYLENOL) 325 MG capsule, Take  by mouth., Disp: , Rfl:   •  CABOMETYX 40 MG tablet, , Disp: , Rfl:   •  diclofenac (VOLTAREN) 1 % gel gel, Apply 4 g topically 4 (Four) Times a Day As Needed (pain or inflammation). To back and knee, Disp: 100 g, Rfl: 0  •  hydroxychloroquine (PLAQUENIL) 200 MG  tablet, , Disp: , Rfl:   •  metoprolol tartrate (LOPRESSOR) 50 MG tablet, TAKE 1 TABLET TWICE DAILY, Disp: 180 tablet, Rfl: 0  •  traMADol (ULTRAM) 50 MG tablet, Take 1-2 tab po q6hr prn pain, Disp: 30 tablet, Rfl: 0    Objective   Physical Exam   Constitutional: She is oriented to person, place, and time. She appears well-developed and well-nourished.   HENT:   Right Ear: Tympanic membrane and ear canal normal.   Left Ear: Tympanic membrane and ear canal normal.   Mouth/Throat: Oropharynx is clear and moist.   Eyes: Conjunctivae and EOM are normal. Pupils are equal, round, and reactive to light.   Neck: No thyromegaly present.   Cardiovascular: Normal rate and regular rhythm.   Pulmonary/Chest: Effort normal and breath sounds normal.   Neurological: She is alert and oriented to person, place, and time.   Skin: Skin is warm and dry.   Psychiatric: She has a normal mood and affect.   Vitals reviewed.      Assessment/Plan   Madelin was seen today for follow-up.    Diagnoses and all orders for this visit:    Peripheral edema    Renal insufficiency, mild    Essential hypertension    Community acquired pneumonia, unspecified laterality        1. CV/ RENAL- discussed that if nephrology calls her before the weekend, to follow their instructions. If they do not get a hold of her in time, to start to cut down on the isosorbide. She is currently taking 3 tabs twice a day; is to cut down to 3 in am and 2 in pm. No change in metoprolol or nifedipine. Discussed that these all can help with BP, fluid overload and afib. Pt will then keep her 1wk recheck with nephrology. Advised to wrap her legs to address the swelling that is limited to just her lower legs.   2. RESP- pneumonia clinically improved.  3. RECHECK- keep appointment in May       Current outpatient and discharge medications have been reconciled for the patient.  Reviewed by: Porsche Martinez MD

## 2019-03-22 DIAGNOSIS — I10 ESSENTIAL HYPERTENSION: ICD-10-CM

## 2019-03-22 RX ORDER — METOPROLOL TARTRATE 50 MG/1
TABLET, FILM COATED ORAL
Qty: 180 TABLET | Refills: 1 | Status: SHIPPED | OUTPATIENT
Start: 2019-03-22 | End: 2019-03-29 | Stop reason: SDUPTHER

## 2019-03-29 ENCOUNTER — TELEPHONE (OUTPATIENT)
Dept: INTERNAL MEDICINE | Facility: CLINIC | Age: 79
End: 2019-03-29

## 2019-03-29 DIAGNOSIS — I10 ESSENTIAL HYPERTENSION: ICD-10-CM

## 2019-03-29 RX ORDER — METOPROLOL TARTRATE 50 MG/1
50 TABLET, FILM COATED ORAL 2 TIMES DAILY
Qty: 6 TABLET | Refills: 0 | Status: SHIPPED | OUTPATIENT
Start: 2019-03-29

## 2019-03-29 NOTE — TELEPHONE ENCOUNTER
PT STATES HER LOPRESSOR HAS NOT COME IN THE MAIL YET. SO SHE IS REQUESTING A RX FOR 3 DAYS TO GET HER THROUGH THE WEEKEND SENT TO [facility]

## 2019-04-05 ENCOUNTER — TELEPHONE (OUTPATIENT)
Dept: INTERNAL MEDICINE | Facility: CLINIC | Age: 79
End: 2019-04-05

## 2019-04-05 NOTE — TELEPHONE ENCOUNTER
Pt called stating that she saw nephrology and the doctor said that the protein/creatnine ratio in her urine is very high at 11,584 and her random urine protein is at 2,757. The nephrologist has advised her that she will need to take 70 mg of prednisone for 1 month. Pt states that she is very hesitant to take such a high dose for such an extended amount of time and would like your opinion on the matter. She has been in and out of the hospital for the last 5 weeks and said that she would rather not even take the prednisone at all if it's going to put her back in the hospital. Please advise.

## 2019-04-05 NOTE — TELEPHONE ENCOUNTER
Pt wants you to call her back and it is about a med. Pt said that she has been in the hospital for 5 weeks.

## 2019-04-05 NOTE — TELEPHONE ENCOUNTER
She needs to do what the specialist suggests. She is fighting kidney cancer and they are the people to let us know her best options. Tell her that we treat people who have inflammation in their arteries with the same dose so it has been used at this dose for other people as well.

## 2019-05-21 ENCOUNTER — OFFICE VISIT (OUTPATIENT)
Dept: INTERNAL MEDICINE | Facility: CLINIC | Age: 79
End: 2019-05-21

## 2019-05-21 VITALS
HEIGHT: 61 IN | WEIGHT: 175 LBS | BODY MASS INDEX: 33.04 KG/M2 | SYSTOLIC BLOOD PRESSURE: 140 MMHG | DIASTOLIC BLOOD PRESSURE: 72 MMHG

## 2019-05-21 DIAGNOSIS — J01.90 ACUTE BACTERIAL SINUSITIS: ICD-10-CM

## 2019-05-21 DIAGNOSIS — H10.022 PINK EYE DISEASE, LEFT: ICD-10-CM

## 2019-05-21 DIAGNOSIS — B96.89 ACUTE BACTERIAL SINUSITIS: ICD-10-CM

## 2019-05-21 DIAGNOSIS — K59.00 CONSTIPATION, UNSPECIFIED CONSTIPATION TYPE: ICD-10-CM

## 2019-05-21 DIAGNOSIS — I10 ESSENTIAL HYPERTENSION: Primary | ICD-10-CM

## 2019-05-21 PROCEDURE — 99214 OFFICE O/P EST MOD 30 MIN: CPT | Performed by: FAMILY MEDICINE

## 2019-05-21 RX ORDER — LEVOFLOXACIN 750 MG/1
TABLET ORAL
Qty: 5 TABLET | Refills: 0 | Status: SHIPPED | OUTPATIENT
Start: 2019-05-21

## 2019-05-21 RX ORDER — CIPROFLOXACIN HYDROCHLORIDE 3.5 MG/ML
1 SOLUTION/ DROPS TOPICAL
Qty: 10 ML | Refills: 0 | Status: SHIPPED | OUTPATIENT
Start: 2019-05-21

## 2019-05-21 NOTE — PATIENT INSTRUCTIONS
"1. CV- HTN- advised to cut the metoprolol dose in half to 25 mg twice a day. No change in procardia. If her BP check is very low, she is to skip the 2nd dose of metoprolol.   2. RESP- sinusitis and pink eye. Will treat with 5 days levaquin and cipro eye drops.  3. GI- post op constipation. Advised to take miralax every day until she is cleared out and then can go to \"as needed\".  4. - kidney cancer- discussed that her chest, abd and pelvic CT appear clear. Discussed that if she has a recurrence she may opt for hospice instead of chemo.   5. RECHECK- 3mo with Dr Julien   "

## 2019-05-21 NOTE — PROGRESS NOTES
Subjective   Madelin Zuñiga is a 78 y.o. female.     History of Present Illness   Here for 2mo recheck. Last seen 3/21/19 for VELMA. Last seen 1/8/19 for 6mo routine. 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 Was seen 4/30/18 for Sjorgrens flare. Was seen 4/27/18 for VELMA. Labs 6/8/17 demo normal CBC (Hg 11.5), CMP (except Cr 1.62, potassium 5.7), TSH, B12 and lipid with , tg 102, HDL 62, . Subsequent labs by specialist 11/21/17 demo Cr 1.62, potassium 5.6; +RF and elevated CRP and ESR.  Labs done 5/10/16 demo Cr stable at 1.5.     1.- left renal ca, h/o partial (1/3) resection 10/10/15. Had post op necrotizing fasciitis, followed by seroma and hernia, followed by abscess with MRSA. Pt became anemic during the process but did well with iron and B12 supplements. Back to baseline at her visit 12/8/17. Pt then had new mass with surgery 4/2018 for left radical / total nephrectomy with no complications. Was then treated with radiation followed by chemo. Recent records reviewed with note from 10/1/18 noting that chemo (optivo) was stopped due to ocular Sjogren/ arthralgia flare. Had significant shrinkage of nodules while on chemo but return once chemo stopped. Last chest CT 12/3/18 showing progressive lung nodules/ metastasis; CT abd neg. Pt changed to cabozantinib and was tolerating it well other than elevated BP. Had lost 10 lb to 168 lb but was taking iron and B12 and felt well. Pt then got fluid overload related to chemo with chemo stopped. Today pt reports she was given a steroid course and has had a lot of fluid retention. States she had a bone scan last week and was advised it was neg. Review of her last CT 5/6/19 shows now concerning lesions in her chest or abd.      2. CV- HTN. Previously on Lisinopril and metoprolol (no diuretic due to sulfa allergy). Has had to increase and decrease meds intermittently related to weight loss while sick. Pt was last changed to metoprolol 25 bid and lisinopril 10 qd by nephrology. Pt  hospitalized [facility] 2/27/19 - 3/12/19 for acute on chronic renal failure, acute respiratory failure with suspected pulmonary edema and accelerated HTN. yet. Echo and resp w/u neg. Pt treated with IV albumin and diuresed adequately. BP meds changed to isosorbid, metoprolol and nifedipine. Today pt reports her BP was up while she was on the steroid but she finished that 2 days ago and for the past 2 days her BP has been down to 116-117/ 55-65. She is off the isosorbide. Is still on metoprolol bid and procardia. Was increased on procardia to 2 and then reduced back to 1hs.     3. RESP- allergies. Has not been able to use antihistamines or singulair due to her Sjogrens. Has been using nasal steroid. Today pt reports she has had a lot of sinus congestion and now has a red left eye. Got worse while on steroid.     4. GI- today pt reports she is having BM qd but after she eats she feels like there is a band around her stomach. CT demo moderate constipation.    5. RHEUM- sjorgrens. Pt sees Dr Obrien in rheumatology and Dr Fernandez in ortho. 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 No NSAID as she is allergic to ibu. Did well with stretches. Had Orthovisc series 9/2015. Was off plaquenil at  visit 5/2017. However, her lab 6/8/17 demo worsened Cr with elevated potassium and pt was referred  back to specialist. Lab 11/20/17 by Dr Cuba demo +RF with elevated CRP (1.5) and ESR (34). Pt did not tolerate Cymbalta and was changed to plaquenil. Was still having dry eyes, seeing a corneal specialist. Pt was seen here 4/30/18 due to partodititis, treated with kenalog and tramadol and did well. Pulled her right shoulder 5/2018, treated with lidocaine gel. At her visit 1/8/19 she reported that the arthralgias and ocular flare she had while on chemo improved when the chemo was changed. Still on plaquenil.       The following portions of the patient's history were reviewed and updated as appropriate: current medications, past family history,  "past medical history, past social history, past surgical history and problem list.    Review of Systems   Cardiovascular: Negative for chest pain.   Gastrointestinal: Negative for abdominal distention and abdominal pain.   Skin: Negative for color change.   Neurological: Negative for tremors, speech difficulty and headaches.   Psychiatric/Behavioral: Negative for agitation and confusion.   All other systems reviewed and are negative.        Current Outpatient Medications:   •  Acetaminophen (TYLENOL) 325 MG capsule, Take  by mouth., Disp: , Rfl:   •  CABOMETYX 40 MG tablet, , Disp: , Rfl:   •  ciprofloxacin (CILOXAN) 0.3 % ophthalmic solution, Administer 1 drop into the left eye 5 (Five) Times a Day., Disp: 10 mL, Rfl: 0  •  diclofenac (VOLTAREN) 1 % gel gel, Apply 4 g topically 4 (Four) Times a Day As Needed (pain or inflammation). To back and knee, Disp: 100 g, Rfl: 0  •  hydroxychloroquine (PLAQUENIL) 200 MG tablet, , Disp: , Rfl:   •  levoFLOXacin (LEVAQUIN) 750 MG tablet, 1 tab po qd x5 days. Take with food, Disp: 5 tablet, Rfl: 0  •  metoprolol tartrate (LOPRESSOR) 50 MG tablet, Take 1 tablet by mouth 2 (Two) Times a Day., Disp: 6 tablet, Rfl: 0  •  traMADol (ULTRAM) 50 MG tablet, Take 1-2 tab po q6hr prn pain, Disp: 30 tablet, Rfl: 0    Objective     /72 (BP Location: Left arm, Patient Position: Sitting)   Ht 154.9 cm (60.98\")   Wt 79.4 kg (175 lb)   BMI 33.08 kg/m²     Physical Exam   Constitutional: She is oriented to person, place, and time. She appears well-developed and well-nourished.   Swollen face/ moon facies   HENT:   Right Ear: Tympanic membrane and ear canal normal.   Left Ear: Tympanic membrane and ear canal normal.   Mouth/Throat: Oropharynx is clear and moist.   Eyes: Conjunctivae and EOM are normal. Pupils are equal, round, and reactive to light.   Neck: No thyromegaly present.   Cardiovascular: Normal rate and regular rhythm.   Pulmonary/Chest: Effort normal and breath sounds normal. " "  Neurological: She is alert and oriented to person, place, and time.   Skin: Skin is warm and dry.   Psychiatric: She has a normal mood and affect.   Vitals reviewed.      Assessment/Plan   Madelin was seen today for hypertension.    Diagnoses and all orders for this visit:    Essential hypertension    Acute bacterial sinusitis  -     levoFLOXacin (LEVAQUIN) 750 MG tablet; 1 tab po qd x5 days. Take with food    Pink eye disease, left  -     ciprofloxacin (CILOXAN) 0.3 % ophthalmic solution; Administer 1 drop into the left eye 5 (Five) Times a Day.    Constipation, unspecified constipation type      1. CV- HTN- advised to cut the metoprolol dose in half to 25 mg twice a day. No change in procardia. If her BP check is very low, she is to skip the 2nd dose of metoprolol.   2. RESP- sinusitis and pink eye. Will treat with 5 days levaquin and cipro eye drops.  3. GI- post op constipation. Advised to take miralax every day until she is cleared out and then can go to \"as needed\".  4. - kidney cancer- discussed that her chest, abd and pelvic CT appear clear. Discussed that if she has a recurrence she may opt for hospice instead of chemo.   5. RECHECK- 3mo with Dr Julien          "

## 2019-09-30 ENCOUNTER — TELEPHONE (OUTPATIENT)
Dept: INTERNAL MEDICINE | Facility: CLINIC | Age: 79
End: 2019-09-30

## 2019-09-30 NOTE — TELEPHONE ENCOUNTER
Do you want me to call or you. I know you can not follow at facility    I have never seen her so would just see the MD that goes to her faciltiy

## 2019-09-30 NOTE — TELEPHONE ENCOUNTER
BONIFACIO THE DAUGHTER OF TRACY SAID THAT TRACY IS AT [facility] [place] AND SAID PATIENT PROBABLY WONT BE ABLE TO MAKE IN HERE ANYMORE TO SEE DR COOK SHE  CANT DRIVE NOR SIT UP AND BONIFACIO WANTED TO KNOW IF DR COOK WILL STILL FOLLOW HER? PLEASE GIVE BONIFACIO A CALL AND LET HER KNOW? THANK YOU